# Patient Record
Sex: FEMALE | Race: WHITE | Employment: PART TIME | URBAN - METROPOLITAN AREA
[De-identification: names, ages, dates, MRNs, and addresses within clinical notes are randomized per-mention and may not be internally consistent; named-entity substitution may affect disease eponyms.]

---

## 2021-07-02 ENCOUNTER — TRANSFERRED RECORDS (OUTPATIENT)
Dept: HEALTH INFORMATION MANAGEMENT | Facility: CLINIC | Age: 56
End: 2021-07-02

## 2021-07-07 ENCOUNTER — TRANSFERRED RECORDS (OUTPATIENT)
Dept: HEALTH INFORMATION MANAGEMENT | Facility: CLINIC | Age: 56
End: 2021-07-07

## 2021-07-23 ENCOUNTER — TRANSFERRED RECORDS (OUTPATIENT)
Dept: HEALTH INFORMATION MANAGEMENT | Facility: CLINIC | Age: 56
End: 2021-07-23

## 2021-07-27 ENCOUNTER — TRANSCRIBE ORDERS (OUTPATIENT)
Dept: OTHER | Age: 56
End: 2021-07-27

## 2021-07-27 DIAGNOSIS — H46.9 OPTIC NEUROPATHY: Primary | ICD-10-CM

## 2021-07-28 ENCOUNTER — MEDICAL CORRESPONDENCE (OUTPATIENT)
Dept: HEALTH INFORMATION MANAGEMENT | Facility: CLINIC | Age: 56
End: 2021-07-28

## 2021-08-31 ENCOUNTER — LAB (OUTPATIENT)
Dept: LAB | Facility: CLINIC | Age: 56
End: 2021-08-31
Attending: OPHTHALMOLOGY
Payer: COMMERCIAL

## 2021-08-31 ENCOUNTER — OFFICE VISIT (OUTPATIENT)
Dept: OPHTHALMOLOGY | Facility: CLINIC | Age: 56
End: 2021-08-31
Attending: STUDENT IN AN ORGANIZED HEALTH CARE EDUCATION/TRAINING PROGRAM
Payer: COMMERCIAL

## 2021-08-31 DIAGNOSIS — H53.40 VISUAL FIELD DEFECT: ICD-10-CM

## 2021-08-31 DIAGNOSIS — H53.40 VISUAL FIELD DEFECT: Primary | ICD-10-CM

## 2021-08-31 DIAGNOSIS — H46.9 OPTIC NEUROPATHY: ICD-10-CM

## 2021-08-31 LAB — FOLATE SERPL-MCNC: 55.4 NG/ML

## 2021-08-31 PROCEDURE — 92083 EXTENDED VISUAL FIELD XM: CPT | Performed by: OPHTHALMOLOGY

## 2021-08-31 PROCEDURE — 92250 FUNDUS PHOTOGRAPHY W/I&R: CPT | Performed by: OPHTHALMOLOGY

## 2021-08-31 PROCEDURE — G0463 HOSPITAL OUTPT CLINIC VISIT: HCPCS

## 2021-08-31 PROCEDURE — 99204 OFFICE O/P NEW MOD 45 MIN: CPT | Mod: GC | Performed by: OPHTHALMOLOGY

## 2021-08-31 PROCEDURE — 99207 FUNDUS AUTOFLUORESCENCE IMAGE (FAF) OU (BOTH EYES): CPT | Performed by: OPHTHALMOLOGY

## 2021-08-31 PROCEDURE — 92133 CPTRZD OPH DX IMG PST SGM ON: CPT | Performed by: OPHTHALMOLOGY

## 2021-08-31 PROCEDURE — 36415 COLL VENOUS BLD VENIPUNCTURE: CPT | Performed by: PATHOLOGY

## 2021-08-31 PROCEDURE — 82746 ASSAY OF FOLIC ACID SERUM: CPT | Mod: 90 | Performed by: PATHOLOGY

## 2021-08-31 ASSESSMENT — EXTERNAL EXAM - RIGHT EYE: OD_EXAM: NORMAL

## 2021-08-31 ASSESSMENT — REFRACTION_WEARINGRX
OD_AXIS: 003
OS_ADD: +3.00
OS_SPHERE: +1.50
OD_ADD: +3.00
OS_AXIS: 146
OS_CYLINDER: +0.50
OD_SPHERE: +2.00
SPECS_TYPE: TRIFOCAL
OD_CYLINDER: +0.25

## 2021-08-31 ASSESSMENT — CONF VISUAL FIELD
OD_NORMAL: 1
OS_NORMAL: 1

## 2021-08-31 ASSESSMENT — VISUAL ACUITY
OS_CC: 20/50
METHOD: SNELLEN - LINEAR
OD_CC: 20/500

## 2021-08-31 ASSESSMENT — TONOMETRY
OD_IOP_MMHG: 18
OS_IOP_MMHG: 15
IOP_METHOD: ICARE

## 2021-08-31 ASSESSMENT — CUP TO DISC RATIO
OD_RATIO: 0.2
OS_RATIO: 0.1

## 2021-08-31 ASSESSMENT — EXTERNAL EXAM - LEFT EYE: OS_EXAM: NORMAL

## 2021-08-31 ASSESSMENT — SLIT LAMP EXAM - LIDS
COMMENTS: NORMAL
COMMENTS: NORMAL
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
## 2021-08-31 NOTE — NURSING NOTE
Chief Complaints and History of Present Illnesses   Patient presents with     Blurred Vision Evaluation     Chief Complaint(s) and History of Present Illness(es)     Blurred Vision Evaluation               Comments     Elissa Henderson is a 56 year old female who presents today for    Progressive vision loss, right eye.   Onset 5 months ago. No precipitating event.   Describes pain sensation behind the right eye, waking up with daily headaches, blurred vision and decreased peripheral vision.   Patient is also starting to notice changes to vision in the left eye.   No previous episode. Patient was started on 60 mg prednisone daily for the last two weeks. Does not feel it has helped with vision recovery.     Tuyet JERONIMO 7:55 AM August 31, 2021

## 2021-09-01 ENCOUNTER — TELEPHONE (OUTPATIENT)
Dept: CONSULT | Facility: CLINIC | Age: 56
End: 2021-09-01

## 2021-09-01 ENCOUNTER — TELEPHONE (OUTPATIENT)
Dept: OPHTHALMOLOGY | Facility: CLINIC | Age: 56
End: 2021-09-01

## 2021-09-01 NOTE — TELEPHONE ENCOUNTER
Lab calling-- Vitamin B1 whole blood sample will need redone per lab-- was spun and needed not to be spun    Lab will reach out to pt to review rescheduling.    Note to facilitator to place new order for Vitamin B1 for     Lab will cancel previous order    Catarino Albarran RN 11:29 AM 09/01/21

## 2021-09-02 DIAGNOSIS — H46.9 OPTIC NEUROPATHY: Primary | ICD-10-CM

## 2021-09-02 NOTE — TELEPHONE ENCOUNTER
Spoke to mao.  She will have drawn at time of genetic counseling appointment.  She will call me once scheduled so I can schedule the lab appointment.      Fe Layton on 9/2/2021 at 5:01 PM

## 2021-09-15 NOTE — TELEPHONE ENCOUNTER
Spoke with patient and assisted in scheduling appointment.     Future Appointments   Date Time Provider Department Center   9/24/2021 11:00 AM Eliane Eid [facility]

## 2021-09-24 ENCOUNTER — VIRTUAL VISIT (OUTPATIENT)
Dept: CONSULT | Facility: CLINIC | Age: 56
End: 2021-09-24
Attending: GENETIC COUNSELOR, MS
Payer: COMMERCIAL

## 2021-09-24 DIAGNOSIS — H53.40 VISUAL FIELD DEFECT: ICD-10-CM

## 2021-09-24 DIAGNOSIS — H46.9 OPTIC NEUROPATHY: Primary | ICD-10-CM

## 2021-09-24 DIAGNOSIS — Z71.83 ENCOUNTER FOR NONPROCREATIVE GENETIC COUNSELING: ICD-10-CM

## 2021-09-24 PROCEDURE — 96040 HC GENETIC COUNSELING, EACH 30 MINUTES: CPT | Mod: GT,95 | Performed by: GENETIC COUNSELOR, MS

## 2021-09-24 PROCEDURE — 999N000103 HC STATISTIC NO CHARGE FACILITY FEE: Mod: GT,95

## 2021-09-24 NOTE — PROGRESS NOTES
Rox is a 56 year old who is being evaluated via a billable video visit.      How would you like to obtain your AVS? MyChart  If the video visit is dropped, the invitation should be resent by: Text to cell phone: [phone]  Will anyone else be joining your video visit? Zenobia Pedraza LPN

## 2021-09-24 NOTE — PATIENT INSTRUCTIONS
Genetics  [facility]     Contact our nurse care coordinator Eboni BURNSN, RN, PHN at [phone] or send a [facility] message for any non-urgent general or medical questions.     If you had genetic testing and have further questions, please contact the genetic counselor:    Catia Eid  Ph: [phone]    To schedule appointments:  Pediatric Call Center for [facility]: [phone]  Neuropsychology [phone]  Radiology/ Imaging/Echocardiogram: [phone]   Services:   [phone]     You should receive a phone call about your next appointment. If you do not receive this within two weeks of your visit, please call [phone].     If you have not already done so consider signing up for [employer] by speaking with the person at the  on your way out or go to [email or web address] to sign up online.     [employer] enables easy and confidential communication with your care team.

## 2021-10-14 NOTE — PROGRESS NOTES
GENETIC COUNSELING CONSULTATION NOTE    Date of visit: 09/24/21    Presenting Information:   Elissa Henderson is a 56 year old female referred to the [facility] due to optic neuropathy. She was seen for a video genetic counseling appointment at the request of Dr. Gordon today.     Elissa is followed by Dr. Gordon in the neuro-ophthalmology clinic for optic neuropathy with decreasing/blurry vision in both eyes. Elissa reports that she noticed symptoms about 4 to 6 months ago and they have gotten progressively worse since that time.  She reports that her symptoms are worse in the morning and she has difficulty with night vision.  Work-up for her optic neuropathy has not yielded a specific cause for her symptoms thus far, so Dr. Gordon referred her to genetic counseling to discuss genetic testing for Lucy hereditary optic neuropathy (LHON).    Elissa reports that she had a brain MRI which revealed optic atrophy but no other brain abnormalities.  She also has diabetes with peripheral neuropathy, which is attributed to her diabetes.  She also has joint problems such as arthritis and needing her knee and shoulder replaced.    Family History: A three generation pedigree was obtained and scanned into the electronic medical record. The relevant portions are described below:      Children- Elissa has five children:    Daughter, Lou, age 42 is healthy and has no vision concerns.  She has 3 sons who are reported to be healthy.    Son, Colt, age 40 is healthy and has no vision concerns.  He has 2 sons and 3 daughters who are healthy.    Son, Dustin, age 38 is healthy and has no vision concerns.  He has 1 daughter who is healthy.    Son, Geovany, age 37 is healthy and has no vision concerns.  He has 2 sons who are healthy.    Daughter, Suzy, age 35 is healthy and has no vision concerns.  She has 2 sons, one of whom has fatty acid oxidation disorder.  Suzy is a confirmed carrier of this genetic  condition.    Siblings- Elissa has 1 brother who is 60 years old and has no vision concerns.  He has several children who are healthy.    Parents- Elissa's mother had a history of diabetes and dementia and passed away at age 82 due to a heart attack. Elissa's father passed away at age 79 due to complications from dementia.  Neither of her parents experienced vision loss symptoms.    Maternal Relatives- Elissa has 1 maternal uncle who is in his late 70s and has a history of a heart attack but no vision concerns.  He has several children who are healthy. Elissa's maternal grandparents are .  They had no known vision concerns.    Paternal Relatives- Elissa has 1 paternal uncle who is alive and well and has several healthy children. Elissa's paternal grandparents are .  No paternal relatives with known vision concerns.    Family history is otherwise largely non-contributory. Maternal ancestry is Armenian and [place] and paternal ancestry is Armenian and [place]. Consanguinity was denied.     Genetic Counseling Discussion:  We reviewed that our bodies are made of cells that each contain a nucleus which houses our chromosomes which are made up of long stretches of DNA containing our nuclear genes. Our nuclear genes serve as the instructions for our bodies to grow and function. We have two copies of each nuclear gene, one inherited from our mother and one inherited from our father. Each of our cells also contain structures called mitochondria. Our cells have many mitochondria which provide energy for the cell and help the cell carry out its' functions. Mitochondria are unique structures in that they contain their own set of DNA called mtDNA, which consists of 37 genes. Mitochondria, and therefore our mtDNA, are only inherited from our mother. Changes in the codes of our nuclear DNA and mtDNA, called mutations, can cause mitochondrial disorders. Because each cell has many mitochondria, all of the  "mitochondria can carry the mutation (referred to as homoplasmy) or only some of the mitochondria can carry the mutation (referred to as heteroplasmy).    Lucy hereditary optic neuropathy (LHON) is a mitochondrial disorder characterized by bilateral, painless, subacute vision loss that develops in young adulthood. Blurring and clouding of the central vision in one eye is usually the first symptom. Similar symptoms develop in the other eye an average of 2 to 3 months later so that both eyes are affected in most cases within 6 months of the initial symptoms. In about 25% of cases vision loss is bilateral at onset. Visual acuity becomes severely reduced to counting fingers or worse and most individuals qualify for registration as legally blind. In some cases, recovery of visual function occurs, but it is usually incomplete. For reasons that are still not fully understood, males are 4-5 times more likely than females to be affected.     Vision loss is typically the only symptoms of LHON but some families have reported other signs and symptoms such as neurologic features of postural tremor, peripheral neuropathy, movement disorders, and MS-like symptoms (usually in females).  Cardiac conduction defects have also been reported. Individuals with LHON who demonstrate these other features are sometimes referred to as having \"LHON plus.\"    As previously mentioned, LHON is a mitochondrial condition, and it is typically caused by one of three mutations in the mtDNA: m.3460G>A in MT-ND1, m.49510H>A in MT-ND4, or m.86114B>C in MT-ND6. LHON mutations are only inherited from the mother. Therefore a woman who has an LHON mutation will pass the mutation to each of her children and a male who has an LHON mutation will not pass the mutation to any of their children. LHON is a condition that shows reduced penetrance meaning not all individuals who carry a mutation will be affected. About 50% of males and 90% of females who carry a LHON " mutation will not be affected. Environmental factors, other genetic factors, and heteroplasmy can factor into whether or not someone may or may not be affected. In theory, the higher the mutation load (a high percentage of heteroplasmy or homoplasmy) the higher the chance of experiencing vision loss symptoms. The penetrance of LHON is also age specific. About 95% of affected individuals have vision loss by age 50. Therefore, it is estimated that an unaffected male age 50 has a less than 1/20 chance of losing his vision.     The LHON mutations also show genotype-phenotype correlation in terms of severity of disease. The m.3460G>A mutation is generally associated with the worst vision impairment, the m.94740B>A mutation is considered the intermediate phenotype, and the m.18300M>C mutation is generally associated with the best long-term visual outcome.    There is currently no cure or treatment FDA approved in the [place] for the vision loss caused by LHON. A medication called Idebenone has shown promise in accelerating visual recovery and improve final visual outcome in patients with LHON and has been approved in some other countries for treatment of LHON. There are also other clinical trials in the [place] for other vitamin supplementation and a gene therapy trial for individuals who carry the m.71268G>A mutation.     We reviewed the availability of genetic testing analysis of mtDNA variants known to cause LHON, with the aim of determining what condition is causing Elissa's optic neuropathy symptoms. We discussed the [facility] LHON Panel which analyzes the mtDNA for 22 LHON variants. The possible results of this test are positive, meaning a mtDNA variant is detected in Elissa, or negative, meaning a mtDNA variant is not detected in Elissa. If the testing is positive, the lab will estimate the level of heteroplasmy in the sample. A negative result greatly reduces the chance of LHON but cannot completely eliminate this as a  possibility as there could be low level heteroplasmy in the sample/ tissue type analyzed.     We discussed the potential benefits of genetic testing and why this genetic testing is medically indicated. A positive result will help determine the etiology of the optic neuropathy noted in Elissa and may guide the medical management for her. It will give us a more accurate risk assessment for other family members, particularly Elissa's children and other maternal relatives. The recommended testing for Elissa  is DIAGNOSTIC testing, and it is NOT investigational.    Elissa consented to genetic testing today. She will be mailed a buccal kit for sample collection. [facility] will conduct a benefits investigation to determine potential cost of testing. If the estimated cost is >$100, Elissa's parents will receive a phone call or text message with the estimated cost and payment options and can decide if they would like to proceed. If the estimated cost is <$100, [facility] will initiate the testing. I will call the Elissa with the results about 4 weeks after the testing begins.    It was a pleasure meeting Elissa  today. She was encouraged to reach out to me if she has any further questions.     Plan:  1. [facility] LHON panel  2. A buccal kit will be mailed to Elissa for sample collection and I will call her with the results about 4 weeks after the lab receives her sample.     Catia Eid MS, [facility]  Licensed Genetic Counselor   [facility]  Phone: [phone]  Fax: [phone]    Time spent in consultation face to face was approximately 30 minutes.

## 2021-10-17 ENCOUNTER — HEALTH MAINTENANCE LETTER (OUTPATIENT)
Age: 56
End: 2021-10-17

## 2021-10-26 ENCOUNTER — TELEPHONE (OUTPATIENT)
Dept: CONSULT | Facility: CLINIC | Age: 56
End: 2021-10-26

## 2021-10-26 NOTE — TELEPHONE ENCOUNTER
I called Rox to review the results of her LHON genetic testing. She was unavailable so I left a voicemail asking her to call me back.     Catia Eid MS, [facility]  Licensed Genetic Counselor  [facility] [place]  Phone: [phone]  Fax: [phone]

## 2021-10-28 NOTE — TELEPHONE ENCOUNTER
I called Elissa and left her another message to call me back to review her genetic test results.     Catia Eid MS, [facility]  Licensed Genetic Counselor  [facility]  Phone: [phone]  Fax: [phone]

## 2021-11-02 ENCOUNTER — TELEPHONE (OUTPATIENT)
Dept: CONSULT | Facility: CLINIC | Age: 56
End: 2021-11-02

## 2021-11-02 DIAGNOSIS — H46.9 OPTIC NEUROPATHY: Primary | ICD-10-CM

## 2021-11-02 DIAGNOSIS — H53.40 VISUAL FIELD DEFECT: ICD-10-CM

## 2021-11-02 NOTE — TELEPHONE ENCOUNTER
Elissa returned my call but I was unavailable so she left me a voicemail.     I called her back today and reached her voicemail. In my voicemail to her, I told her that the results of her LHON testing were negative/normal, meaning she was not found to have one of the 21 LHON mutations. We can discuss other genetic testing for other possible causes of optic atrophy is she is interested. She can call me back if she wants to discuss this further. I will also send her a [employer] message with the news of her results and mail her a copy of her test report.     Catia Eid MS, [facility]  Licensed Genetic Counselor  [facility] [place]  Phone: [phone]  Fax: [phone]

## 2021-11-02 NOTE — TELEPHONE ENCOUNTER
Elissa called me back and we reviewed the results of her LHON testing.     The results of Elissa's Lucy Hereditary Optic Neuropathy (LHON) panel are completely negative/normal, meaning none of the LHON mutations were found in her. This greatly reduces the likelihood that she has LHON.    We discussed that there are several nuclear genes that can cause optic atrophy that were not tested for previously. Other genes that can cause optic atrophy can be non-syndromic, meaning only the eyes/vision are affected, or syndromic, meaning other organ systems can be involved. Some of the syndromic forms of optic atrophy cause other features such as neurodevelopmental conditions, seizures, hearing loss, neuropathy (Charcot-Faye-Tooth disease), movement disorders (dystonia or ataxia), and other features. Knowing if Elissa has a syndromic form of optic atrophy is important because it would change her medical recommendations and management. Identifying a genetic cause for her optic neuropathy would also provide information for risk to other family members like Elissa's children and grandchildren.     We discussed the [facility] optic atrophy panel which analyzes 14 genes: ACO2, AP3B2, CISD2, DNM1L, MECR, MFN2, NR2F1, OPA1, OPA3, GER3LT4, TIMM8A, EEIZ239H, WFS1, YME1L1. We discussed the possible results of this genetic testing: positive, negative, and variants of uncertain significance. We also reviewed the insurance prior authorization process.     Elissa would like to proceed with the additional testing. I will ask our  to start the insurance prior authorization process and she will contact Elissa in a couple weeks with the status. If we proceed with testing, Elissa can have her blood drawn at a [facility].     Catia Eid MS, [facility]  Licensed Genetic Counselor  [facility]  Phone: [phone]  Fax: [phone]

## 2021-12-02 ENCOUNTER — TELEPHONE (OUTPATIENT)
Dept: LAB | Facility: CLINIC | Age: 56
End: 2021-12-02
Payer: COMMERCIAL

## 2021-12-07 ENCOUNTER — TELEPHONE (OUTPATIENT)
Dept: LAB | Facility: CLINIC | Age: 56
End: 2021-12-07
Payer: COMMERCIAL

## 2021-12-07 NOTE — PROGRESS NOTES
Notified Rox that we received prior authorization approval for her genetic testing. Valid from 11/08/2021 to 11/07/2022. Authorization number is [id number].     Explained that insurance benefits may still apply, therefore, there could be an out of pocket cost.    Rox expressed understanding and stated that she wants to proceed with testing. The closest [facility] is over 50 miles away and she is struggling with vision so I will be sending her a buccal swab. We will call when results are available.     Rox noted that her eye pain has worsened and asked if someone from Dr. Gordon's team could follow up with her regarding her treatment plan. I'm routing this message to Dr. Gordon.    Ella Dominguez, GRANT  Genomics Billing    [facility]   Molecular Diagnostics Laboratory  [address] [place] [id number] [phone]

## 2021-12-08 NOTE — TELEPHONE ENCOUNTER
Attempted to call patient to schedule follow-up appointment with Dr. Gordon.  Will send [employer] message.    Fe Layton on 12/8/2021 at 2:44 PM

## 2021-12-14 ENCOUNTER — LAB (OUTPATIENT)
Dept: LAB | Facility: CLINIC | Age: 56
End: 2021-12-14
Payer: COMMERCIAL

## 2021-12-14 DIAGNOSIS — H46.9 OPTIC NEUROPATHY: ICD-10-CM

## 2021-12-14 DIAGNOSIS — H53.40 VISUAL FIELD DEFECT: ICD-10-CM

## 2021-12-14 LAB
LAB DIRECTOR INTERPRETATION: NORMAL
SIGNIFICANT RESULTS: NORMAL
SPECIMEN DESCRIPTION: NORMAL
TEST DETAILS, MDL: NORMAL

## 2021-12-17 LAB — INTERPRETATION: NORMAL

## 2021-12-22 DIAGNOSIS — H53.40 VISUAL FIELD DEFECT: Primary | ICD-10-CM

## 2021-12-31 ENCOUNTER — LAB (OUTPATIENT)
Dept: LAB | Facility: CLINIC | Age: 56
End: 2021-12-31
Payer: COMMERCIAL

## 2021-12-31 DIAGNOSIS — H53.40 VISUAL FIELD DEFECT: ICD-10-CM

## 2021-12-31 DIAGNOSIS — H46.9 OPTIC NEUROPATHY: Primary | ICD-10-CM

## 2021-12-31 PROCEDURE — G0452 MOLECULAR PATHOLOGY INTERPR: HCPCS | Mod: 26 | Performed by: PATHOLOGY

## 2022-01-07 ENCOUNTER — TELEPHONE (OUTPATIENT)
Dept: CONSULT | Facility: CLINIC | Age: 57
End: 2022-01-07
Payer: COMMERCIAL

## 2022-01-07 NOTE — TELEPHONE ENCOUNTER
I spoke with Elissa and we reviewed the results of her additional genetic testing for 14 genes that can cause optic atrophy including the most common cause of dominant optic atrophy, OPA1. The results of this test are negative/normal, meaning no changes were identified in these additional genes.    The fact that her LHON testing and her optic atrophy panel were negative, cannot completely rule out an underlying genetic cause for Elissa's vision symptoms, as these tests were not analyzing every gene. However, I do not have other genetic testing to recommend for her at this time unless Dr. Gordon thinks of another possible condition when he sees her again for follow-up.      Elissa reports that she continues to have eye pain and feels her night vision is getting worse. She is scheduled to see Dr. Gordon again on 2/23/21. In the meantime I will mail Elissa a copy of her genetic test report and she can reach out to me if she has any other questions.     Catia Eid MS, [facility]  Licensed Genetic Counselor  [facility] [place]  Phone: [phone]  Fax: [phone]

## 2022-01-07 NOTE — TELEPHONE ENCOUNTER
I called Elissa and left her a voicemail saying I was calling to review the results of her additional genetic testing. I asked Elissa to call me back to review.     Catia Eid MS, [facility]  Licensed Genetic Counselor  [facility] [place]  Phone: [phone]  Fax: [phone]

## 2022-02-23 ENCOUNTER — OFFICE VISIT (OUTPATIENT)
Dept: OPHTHALMOLOGY | Facility: CLINIC | Age: 57
End: 2022-02-23
Payer: COMMERCIAL

## 2022-02-23 ENCOUNTER — TELEPHONE (OUTPATIENT)
Dept: OPHTHALMOLOGY | Facility: CLINIC | Age: 57
End: 2022-02-23

## 2022-02-23 DIAGNOSIS — H53.40 VISUAL FIELD DEFECT: Primary | ICD-10-CM

## 2022-02-23 DIAGNOSIS — H57.10 PAIN IN OR AROUND EYE, UNSPECIFIED LATERALITY: ICD-10-CM

## 2022-02-23 PROBLEM — Z87.891 HX OF NICOTINE DEPENDENCE: Status: ACTIVE | Noted: 2019-06-05

## 2022-02-23 PROBLEM — L73.2 HIDRADENITIS: Status: ACTIVE | Noted: 2020-06-15

## 2022-02-23 PROBLEM — M54.42 CHRONIC LEFT-SIDED LOW BACK PAIN WITH LEFT-SIDED SCIATICA: Status: ACTIVE | Noted: 2020-09-16

## 2022-02-23 PROBLEM — G47.33 OSA (OBSTRUCTIVE SLEEP APNEA): Status: ACTIVE | Noted: 2019-05-28

## 2022-02-23 PROBLEM — F41.1 GAD (GENERALIZED ANXIETY DISORDER): Status: ACTIVE | Noted: 2021-04-20

## 2022-02-23 PROBLEM — M43.10 DEGENERATIVE SPONDYLOLISTHESIS: Status: ACTIVE | Noted: 2021-01-11

## 2022-02-23 PROBLEM — E11.8 TYPE 2 DIABETES MELLITUS WITH COMPLICATION, WITHOUT LONG-TERM CURRENT USE OF INSULIN (H): Status: ACTIVE | Noted: 2019-07-10

## 2022-02-23 PROBLEM — E66.811 CLASS 1 OBESITY DUE TO EXCESS CALORIES WITH SERIOUS COMORBIDITY AND BODY MASS INDEX (BMI) OF 34.0 TO 34.9 IN ADULT: Status: ACTIVE | Noted: 2020-09-16

## 2022-02-23 PROBLEM — M79.89 SOFT TISSUE INFECTION OF LUMBAR SPINE: Status: ACTIVE | Noted: 2021-01-26

## 2022-02-23 PROBLEM — K21.9 GASTROESOPHAGEAL REFLUX DISEASE WITHOUT ESOPHAGITIS: Status: ACTIVE | Noted: 2020-11-03

## 2022-02-23 PROBLEM — E11.69 DYSLIPIDEMIA ASSOCIATED WITH TYPE 2 DIABETES MELLITUS (H): Status: ACTIVE | Noted: 2018-03-08

## 2022-02-23 PROBLEM — I15.2 HYPERTENSION ASSOCIATED WITH DIABETES (H): Status: ACTIVE | Noted: 2018-03-08

## 2022-02-23 PROBLEM — F43.10 PTSD (POST-TRAUMATIC STRESS DISORDER): Status: ACTIVE | Noted: 2021-04-20

## 2022-02-23 PROBLEM — E66.09 CLASS 1 OBESITY DUE TO EXCESS CALORIES WITH SERIOUS COMORBIDITY AND BODY MASS INDEX (BMI) OF 34.0 TO 34.9 IN ADULT: Status: ACTIVE | Noted: 2020-09-16

## 2022-02-23 PROBLEM — G89.29 CHRONIC LEFT-SIDED LOW BACK PAIN WITH LEFT-SIDED SCIATICA: Status: ACTIVE | Noted: 2020-09-16

## 2022-02-23 PROBLEM — J43.8 OTHER EMPHYSEMA (H): Status: ACTIVE | Noted: 2019-05-22

## 2022-02-23 PROBLEM — Z72.0 TOBACCO ABUSE: Status: ACTIVE | Noted: 2020-04-06

## 2022-02-23 PROBLEM — G43.909 MIGRAINE: Status: ACTIVE | Noted: 2022-02-23

## 2022-02-23 PROBLEM — M19.012 PRIMARY OSTEOARTHRITIS, LEFT SHOULDER: Status: ACTIVE | Noted: 2018-04-30

## 2022-02-23 PROBLEM — F33.1 MAJOR DEPRESSIVE DISORDER, RECURRENT, MODERATE (H): Status: ACTIVE | Noted: 2021-04-13

## 2022-02-23 PROBLEM — E86.0 DEHYDRATION: Status: ACTIVE | Noted: 2019-12-27

## 2022-02-23 PROBLEM — E11.59 HYPERTENSION ASSOCIATED WITH DIABETES (H): Status: ACTIVE | Noted: 2018-03-08

## 2022-02-23 PROBLEM — B99.9 SOFT TISSUE INFECTION OF LUMBAR SPINE: Status: ACTIVE | Noted: 2021-01-26

## 2022-02-23 PROBLEM — E78.5 DYSLIPIDEMIA ASSOCIATED WITH TYPE 2 DIABETES MELLITUS (H): Status: ACTIVE | Noted: 2018-03-08

## 2022-02-23 PROBLEM — Z98.84 S/P LAPAROSCOPIC SLEEVE GASTRECTOMY: Status: ACTIVE | Noted: 2019-09-05

## 2022-02-23 PROCEDURE — 99215 OFFICE O/P EST HI 40 MIN: CPT | Mod: GC | Performed by: OPHTHALMOLOGY

## 2022-02-23 PROCEDURE — 92083 EXTENDED VISUAL FIELD XM: CPT | Mod: GC | Performed by: OPHTHALMOLOGY

## 2022-02-23 PROCEDURE — 92133 CPTRZD OPH DX IMG PST SGM ON: CPT | Mod: GC | Performed by: OPHTHALMOLOGY

## 2022-02-23 RX ORDER — CITALOPRAM HYDROBROMIDE 40 MG/1
40 TABLET ORAL DAILY
COMMUNITY
Start: 2021-03-16

## 2022-02-23 RX ORDER — LISINOPRIL 20 MG/1
20 TABLET ORAL DAILY
COMMUNITY
Start: 2022-02-10

## 2022-02-23 RX ORDER — DULOXETIN HYDROCHLORIDE 60 MG/1
CAPSULE, DELAYED RELEASE ORAL
COMMUNITY
Start: 2022-01-25

## 2022-02-23 RX ORDER — FLUTICASONE PROPIONATE 50 MCG
SPRAY, SUSPENSION (ML) NASAL
COMMUNITY
Start: 2022-02-17

## 2022-02-23 RX ORDER — MELOXICAM 7.5 MG/1
7.5 TABLET ORAL 2 TIMES DAILY
COMMUNITY
Start: 2022-01-24

## 2022-02-23 RX ORDER — CETIRIZINE HYDROCHLORIDE 10 MG/1
TABLET ORAL
COMMUNITY
Start: 2021-09-23

## 2022-02-23 RX ORDER — BUDESONIDE AND FORMOTEROL FUMARATE DIHYDRATE 160; 4.5 UG/1; UG/1
AEROSOL RESPIRATORY (INHALATION)
COMMUNITY
Start: 2022-02-16

## 2022-02-23 RX ORDER — ROPINIROLE 0.5 MG/1
TABLET, FILM COATED ORAL
COMMUNITY
Start: 2022-02-10

## 2022-02-23 RX ORDER — GABAPENTIN 300 MG/1
CAPSULE ORAL
COMMUNITY
Start: 2022-02-10

## 2022-02-23 RX ORDER — BETAMETHASONE DIPROPIONATE 0.5 MG/G
CREAM TOPICAL
COMMUNITY
Start: 2022-01-11

## 2022-02-23 RX ORDER — FOLIC ACID 1 MG/1
1000 TABLET ORAL DAILY
COMMUNITY
Start: 2022-01-25

## 2022-02-23 RX ORDER — PREGABALIN 50 MG/1
CAPSULE ORAL
COMMUNITY
Start: 2022-01-22

## 2022-02-23 ASSESSMENT — REFRACTION_WEARINGRX
OS_ADD: +3.00
OS_AXIS: 146
OD_ADD: +3.00
OS_CYLINDER: +0.50
OS_SPHERE: +1.50
OD_AXIS: 003
SPECS_TYPE: TRIFOCAL
OD_SPHERE: +2.00
OD_CYLINDER: +0.25

## 2022-02-23 ASSESSMENT — CUP TO DISC RATIO
OS_RATIO: 0.1
OD_RATIO: 0.2

## 2022-02-23 ASSESSMENT — CONF VISUAL FIELD
OS_SUPERIOR_NASAL_RESTRICTION: 3
OS_SUPERIOR_TEMPORAL_RESTRICTION: 1
METHOD: COUNTING FINGERS
OD_SUPERIOR_NASAL_RESTRICTION: 3
OD_INFERIOR_TEMPORAL_RESTRICTION: 3
OS_INFERIOR_TEMPORAL_RESTRICTION: 3
OD_INFERIOR_NASAL_RESTRICTION: 1
OD_SUPERIOR_TEMPORAL_RESTRICTION: 1
OS_INFERIOR_NASAL_RESTRICTION: 3

## 2022-02-23 ASSESSMENT — REFRACTION_MANIFEST
OS_SPHERE: +1.50
OD_AXIS: 180
OD_SPHERE: -2.00
OS_AXIS: 165
OS_ADD: +3.00
OD_ADD: +3.00
OS_CYLINDER: +0.50
OD_CYLINDER: +0.50

## 2022-02-23 ASSESSMENT — VISUAL ACUITY
OS_CC: 20/30
METHOD: SNELLEN - LINEAR
OD_CC: 20/400
CORRECTION_TYPE: GLASSES

## 2022-02-23 ASSESSMENT — SLIT LAMP EXAM - LIDS
COMMENTS: NORMAL
COMMENTS: NORMAL

## 2022-02-23 ASSESSMENT — TONOMETRY
OD_IOP_MMHG: 11
OS_IOP_MMHG: 12
IOP_METHOD: ICARE

## 2022-02-23 ASSESSMENT — EXTERNAL EXAM - LEFT EYE: OS_EXAM: NORMAL

## 2022-02-23 ASSESSMENT — EXTERNAL EXAM - RIGHT EYE: OD_EXAM: NORMAL

## 2022-02-23 NOTE — LETTER
2022         RE:  :  MRN: Elissa Henderson  1965  [id number]     Dear Dr. Adan:     Your patient, Elissa Henderson, returned for neuro-ophthalmic follow up. My assessment and plan are below.  For further details, please see my attached clinic note.      Assessment & Plan     Elissa Henderson is a 56 year old female with the following diagnoses:   1. Visual field defect       Patient was last seen on 21 for evaluation.  Patient had been complaining of slowly progressive vision lsos in both eyes - right eye greater than left eye. Labs revealed low folate and high homocysteine; she was started on folate supplementation for this by Dr. Adan. I obtained labs to look for evidence of nutritional optic neuropathy.  Folate level had now normalized.  I also felt LHON was a possibility and had her see a genetic counselor.   LHON genetic testing was unremarkable.  I felt it was also possible nonroganic vision lsos as her pupils were brisk and ganglion cell layer so normal looking.      B1 was not completed as sample needed to be redone and patient did not return to have drawn.       She reports that subjectively, her vision is significantly worse in the right > left eyes. She reports that she is bumping into things constantly. She is a  and is having trouble doing her work.     She endorses pulsatile tinnitus, TVOs (daily, though not every single time she stands).     She currently takes amitriptyline nightly and has done so for one year.     Visual acuity with correction was 20/400 in the right eye and 20/30 in the left eye. IOP was 11 in the right eye and 12 in the left eye. Visual fields were full in both eyes. Ocular motility was full in all gaze directions. Color plates were 1/11 in the right eye and 5/11 in the left eye.  Pupils were equal, round and reactive to light with no RAPD.     Strabismus exam: full, ortho    Anterior segment exam: normal  Dilated fundus exam: crowded nerves  with elevation tortuous vasculature, otherwise normal.    Tests ordered and interpreted today:  OCT rNFL demonstrated a mean NFL thickness of 115 in the right eye and 121 in the left eye. Thickness profile demonstrated normal thickness in all quadrants in the right eye and normal thickness in all quadrants in the left eye.  VF: Glaucoma TOP visual field was performed. Test was of fair reliability. Global depression of the right eye with an island of central vision. Arcuate scotoma left eye    Patient has marked vision loss in the right > left eyes of uncertain etiology. OCT RNFL not significantly changed. Only notable exam finding is vessel tortuosity and crowded discs in both eyes.  Lucy's Hereditary Optic Neuropathy (LHON) testing was unremarkable.  Patient also underwent testing for other causes of optic atrophy and this was also normal.    Overall, the patient does not have a structural cause of her vision loss either on her MRI or her OCT imaging.  With refraction her visual acuity can be improved to 20/25 both eyes.  She appears to have a significant (-4 D) myopic shift RIGHT eye, which is likely the result of lenticular changes.  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  I trialed her in the office with this new prescription and she appreciates it.  I suggested that she return in 6 months to recheck her vision.  There is some component of functional overlay here, but I expect her vision to test better at the next visit.      40 minutes spent on the date of encounter doing chart review, history and exam, documentation, and further activities as noted above.         Again, thank you for allowing me to participate in the care of your patient.      Sincerely,    Yaw Gordon MD  Professor  Ophthalmology Residency   Director of Neuro-Ophthalmology  [facility] [facility] Endowed Chair  Departments of Ophthalmology, Neurology, and Neurosurgery  [facility]  [facility] [address] [place]  [address]  T - [phone]  F - [phone]  SHAHLA [email or web address]      CC: Yaw Adan MD  [facility]  [address] [facility] [address]  Via Fax: [phone]    DX =  Myopic shift, nuclear sclerosis, presumed optic neuropathy

## 2022-02-23 NOTE — TELEPHONE ENCOUNTER
LVM for patient regarding scheduling a Return in about 6 months (around 8/23/2022) for v/t/d/LVC/rnfl. Provided Eye Clinic number and made a Recall Letter.

## 2022-02-23 NOTE — NURSING NOTE
"Chief Complaints and History of Present Illnesses   Patient presents with     Follow Up     6 month f/u Optic neuropathy, worsening VA and eye pain     Chief Complaint(s) and History of Present Illness(es)     Follow Up     Laterality: both eyes    Course: gradually worsening    Associated symptoms: eye pain, photophobia, headache and dryness.  Negative for tearing    Treatments tried: no treatments    Pain scale: 9/10    Comments: 6 month f/u Optic neuropathy, worsening VA and eye pain              Comments     Pt notes that VA is \"way worse\", LE VA \"is still okay\", she notes that she feels that her VA has been worsening for the last 3-4 months. She notes that she has pain in the RE and around the RE, having HA's as well, ongoing pain for about 5-6 months, HA are daily, worsening over time, constant pain, sharp pain, dull ache, stinging, takes ibuprofen and it helps for a bit but pt notes she cant take much ibuprofen. Hasn't taken any pain med for relief today.     Patient doesn't check BGL  Last A1C: 5.? About 6 months ago  No results found for: A1C      Marylin LUO February 23, 2022 9:35 AM                  "

## 2022-02-23 NOTE — Clinical Note
2/23/2022       RE: Elissa Henderson  [address] [facility] [address]     Dear Colleague,    Thank you for referring your patient, Elissa Henderson, to the [facility] - [place] at [facility]. Please see a copy of my visit note below.           Assessment & Plan     Elissa Henderson is a 56 year old female with the following diagnoses:   1. Visual field defect       Patient was last seen on 8/31/21 for evaluation.  Patient had been complaining of slowly progressive vision lsos in both eyes - right eye greater than left eye. Labs revealed low folate and high homocysteine; she was started on folate supplementation for this by Dr. Adan. I obtained labs to look for evidence of nutritional optic neuropathy.  Folate level had now normalized.  I also felt LHON was a possibility and had her see a genetic counselor.   LHON genetic testing was unremarkable.  I felt it was also possible nonroganic vision lsos as her pupils were brisk and ganglion cell layer so normal looking.      B1 was not completed as sample needed to be redone and patient did not return to have drawn.       She reports that subjectively, her vision is significantly worse in the right > left eyes. She reports that she is bumping into things constantly. She is a  and is having trouble doing her work.     She endorses pulsatile tinnitus, TVOs (daily, though not every single time she stands).     She currently takes amitriptyline nightly and has done so for one year.     Visual acuity with correction was 20/400 in the right eye and 20/30 in the left eye. IOP was 11 in the right eye and 12 in the left eye. Visual fields were full in both eyes. Ocular motility was full in all gaze directions. Color plates were 1/11 in the right eye and 5/11 in the left eye.  Pupils were equal, round and reactive to light with no RAPD.     Strabismus exam: full, ortho    Anterior segment exam:  normal  Dilated fundus exam: crowded nerves with elevation tortuous vasculature, otherwise normal.    Tests ordered and interpreted today:  OCT rNFL demonstrated a mean NFL thickness of 115 in the right eye and 121 in the left eye. Thickness profile demonstrated normal thickness in all quadrants in the right eye and normal thickness in all quadrants in the left eye.  VF: Glaucoma TOP visual field was performed. Test was of fair reliability. Global depression of the right eye with an island of central vision. Arcuate scotoma left eye    Patient has marked vision loss in the right > left eyes of uncertain etiology. OCT RNFL not significantly changed. Only notable exam finding is vessel tortuosity and crowded discs in both eyes.  Lucy's Hereditary Optic Neuropathy (LHON) testing was unremarkable.  Patient also underwent testing for other causes of optic atrophy and this was also normal.    Overall, the patient does not have a structural cause of her vision loss either on her MRI or her OCT imaging.  With refraction her visual acuity can be improved to 20/25 both eyes.  She appears to have a significant (-4 D) myopic shift RIGHT eye, which is likely the result of lenticular changes.  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  I trialed her in the office with this new prescription and she appreciates it.  I suggested that she return in 6 months to recheck her vision.  There is some component of functional overlay here, but I expect her vision to test better at the next visit.      40 minutes spent on the date of encounter doing chart review, history and exam, documentation, and further activities as noted above.           Attending Physician Attestation:  Complete documentation of historical and exam elements from today's encounter can be found in the full encounter summary report (not reduplicated in this progress note).  I personally obtained the chief complaint(s) and history of present  illness.  I confirmed and edited as necessary the review of systems, past medical/surgical history, family history, social history, and examination findings as documented by others; and I examined the patient myself.  I personally reviewed the relevant tests, images, and reports as documented above.  I formulated and edited as necessary the assessment and plan and discussed the findings and management plan with the patient and family. I personally reviewed the ophthalmic test(s) associated with this encounter, agree with the interpretation(s) as documented by the resident/fellow, and have edited the corresponding report(s) as necessary.  - Yaw Sarabia, DO   PGY-5 Neuro-Ophthalmology Fellow          Again, thank you for allowing me to participate in the care of your patient.      Sincerely,    Yaw Gordon MD

## 2022-02-23 NOTE — PATIENT INSTRUCTIONS
You have been diagnosed with Dry eye syndrome.  Symptoms of Dry eye syndrome can include double vision, eye pain, blurry vision, stinging, burning, tearing, eye redness.      Some useful instructions are listed below:     1.  Use artificial tears on a regular basis.  If you use artificial tear drops with preservative, you can use them up to 4-6 times per day. If you use artificial tear drops without preservatives, then you can use them more often.      2.  Wash your eyelashes with hot water.  Do not make the water so hot that you burn yourself, but the water should be more than just warm.  Often patients will wash their eyelashes in the shower under the hot water.  You should rub gently along the base of your eyelashes.      3.  Taking fish oil capsules twice a day for a month and then once a day after that can help improve Dry eye symptoms.      4.  Drink a lot of water.  Hydration can be helpful.      5.  Humidify your environment.      6.  If this is not beneficial, other treatments can include punctal plugs or cautery, corticosteroid eye drops, Restasis, Lipiflow, or autologous serum.  If you are still struggling with dry eye symptoms, call Dr. Gordon's office for other therapies or a referral to a dry eye specialist.

## 2022-02-23 NOTE — PROGRESS NOTES
Assessment & Plan     Elissa Henderson is a 56 year old female with the following diagnoses:   1. Visual field defect       Patient was last seen on 8/31/21 for evaluation.  Patient had been complaining of slowly progressive vision lsos in both eyes - right eye greater than left eye. Labs revealed low folate and high homocysteine; she was started on folate supplementation for this by Dr. Adan. I obtained labs to look for evidence of nutritional optic neuropathy.  Folate level had now normalized.  I also felt LHON was a possibility and had her see a genetic counselor.   LHON genetic testing was unremarkable.  I felt it was also possible nonroganic vision lsos as her pupils were brisk and ganglion cell layer so normal looking.      B1 was not completed as sample needed to be redone and patient did not return to have drawn.       She reports that subjectively, her vision is significantly worse in the right > left eyes. She reports that she is bumping into things constantly. She is a  and is having trouble doing her work.     She endorses pulsatile tinnitus, TVOs (daily, though not every single time she stands).     She currently takes amitriptyline nightly and has done so for one year.     Visual acuity with correction was 20/400 in the right eye and 20/30 in the left eye. IOP was 11 in the right eye and 12 in the left eye. Visual fields were full in both eyes. Ocular motility was full in all gaze directions. Color plates were 1/11 in the right eye and 5/11 in the left eye.  Pupils were equal, round and reactive to light with no RAPD.     Strabismus exam: full, ortho    Anterior segment exam: normal  Dilated fundus exam: crowded nerves with elevation tortuous vasculature, otherwise normal.    Tests ordered and interpreted today:  OCT rNFL demonstrated a mean NFL thickness of 115 in the right eye and 121 in the left eye. Thickness profile demonstrated normal thickness in all quadrants in the  right eye and normal thickness in all quadrants in the left eye.  VF: Glaucoma TOP visual field was performed. Test was of fair reliability. Global depression of the right eye with an island of central vision. Arcuate scotoma left eye    Patient has marked vision loss in the right > left eyes of uncertain etiology. OCT RNFL not significantly changed. Only notable exam finding is vessel tortuosity and crowded discs in both eyes.  Lucy's Hereditary Optic Neuropathy (LHON) testing was unremarkable.  Patient also underwent testing for other causes of optic atrophy and this was also normal.    Overall, the patient does not have a structural cause of her vision loss either on her MRI or her OCT imaging.  With refraction her visual acuity can be improved to 20/25 both eyes.  She appears to have a significant (-4 D) myopic shift RIGHT eye, which is likely the result of lenticular changes.  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  I trialed her in the office with this new prescription and she appreciates it.  I suggested that she return in 6 months to recheck her vision.  There is some component of functional overlay here, but I expect her vision to test better at the next visit.      40 minutes spent on the date of encounter doing chart review, history and exam, documentation, and further activities as noted above.           Attending Physician Attestation:  Complete documentation of historical and exam elements from today's encounter can be found in the full encounter summary report (not reduplicated in this progress note).  I personally obtained the chief complaint(s) and history of present illness.  I confirmed and edited as necessary the review of systems, past medical/surgical history, family history, social history, and examination findings as documented by others; and I examined the patient myself.  I personally reviewed the relevant tests, images, and reports as documented above.  I  formulated and edited as necessary the assessment and plan and discussed the findings and management plan with the patient and family. I personally reviewed the ophthalmic test(s) associated with this encounter, agree with the interpretation(s) as documented by the resident/fellow, and have edited the corresponding report(s) as necessary.  - Yaw Sarabia, DO   PGY-5 Neuro-Ophthalmology Fellow

## 2022-04-03 ENCOUNTER — HEALTH MAINTENANCE LETTER (OUTPATIENT)
Age: 57
End: 2022-04-03

## 2022-07-24 ENCOUNTER — HEALTH MAINTENANCE LETTER (OUTPATIENT)
Age: 57
End: 2022-07-24

## 2022-10-03 ENCOUNTER — HEALTH MAINTENANCE LETTER (OUTPATIENT)
Age: 57
End: 2022-10-03

## 2023-02-11 ENCOUNTER — HEALTH MAINTENANCE LETTER (OUTPATIENT)
Age: 58
End: 2023-02-11

## 2023-05-20 ENCOUNTER — HEALTH MAINTENANCE LETTER (OUTPATIENT)
Age: 58
End: 2023-05-20

## 2023-10-21 ENCOUNTER — HEALTH MAINTENANCE LETTER (OUTPATIENT)
Age: 58
End: 2023-10-21

## 2024-03-09 ENCOUNTER — HEALTH MAINTENANCE LETTER (OUTPATIENT)
Age: 59
End: 2024-03-09

## 2024-07-27 ENCOUNTER — HEALTH MAINTENANCE LETTER (OUTPATIENT)
Age: 59
End: 2024-07-27

## 2024-12-14 ENCOUNTER — HEALTH MAINTENANCE LETTER (OUTPATIENT)
Age: 59
End: 2024-12-14